# Patient Record
Sex: FEMALE | HISPANIC OR LATINO | URBAN - NONMETROPOLITAN AREA
[De-identification: names, ages, dates, MRNs, and addresses within clinical notes are randomized per-mention and may not be internally consistent; named-entity substitution may affect disease eponyms.]

---

## 2020-08-26 ENCOUNTER — APPOINTMENT (RX ONLY)
Dept: URBAN - NONMETROPOLITAN AREA CLINIC 7 | Facility: CLINIC | Age: 55
Setting detail: DERMATOLOGY
End: 2020-08-26

## 2020-08-26 DIAGNOSIS — D18.0 HEMANGIOMA: ICD-10-CM

## 2020-08-26 DIAGNOSIS — D22 MELANOCYTIC NEVI: ICD-10-CM

## 2020-08-26 DIAGNOSIS — L81.4 OTHER MELANIN HYPERPIGMENTATION: ICD-10-CM

## 2020-08-26 DIAGNOSIS — L82.0 INFLAMED SEBORRHEIC KERATOSIS: ICD-10-CM

## 2020-08-26 DIAGNOSIS — L81.1 CHLOASMA: ICD-10-CM

## 2020-08-26 DIAGNOSIS — L82.1 OTHER SEBORRHEIC KERATOSIS: ICD-10-CM

## 2020-08-26 PROBLEM — D18.01 HEMANGIOMA OF SKIN AND SUBCUTANEOUS TISSUE: Status: ACTIVE | Noted: 2020-08-26

## 2020-08-26 PROBLEM — D22.9 MELANOCYTIC NEVI, UNSPECIFIED: Status: ACTIVE | Noted: 2020-08-26

## 2020-08-26 PROBLEM — D22.5 MELANOCYTIC NEVI OF TRUNK: Status: ACTIVE | Noted: 2020-08-26

## 2020-08-26 PROCEDURE — 17110 DESTRUCTION B9 LES UP TO 14: CPT

## 2020-08-26 PROCEDURE — 99203 OFFICE O/P NEW LOW 30 MIN: CPT | Mod: 25

## 2020-08-26 PROCEDURE — ? COUNSELING

## 2020-08-26 PROCEDURE — ? LIQUID NITROGEN

## 2020-08-26 ASSESSMENT — LOCATION ZONE DERM
LOCATION ZONE: NECK
LOCATION ZONE: FACE
LOCATION ZONE: TRUNK
LOCATION ZONE: SCALP

## 2020-08-26 ASSESSMENT — LOCATION DETAILED DESCRIPTION DERM
LOCATION DETAILED: RIGHT SUPERIOR UPPER BACK
LOCATION DETAILED: RIGHT CLAVICULAR NECK
LOCATION DETAILED: LEFT INFERIOR MEDIAL UPPER BACK
LOCATION DETAILED: EPIGASTRIC SKIN
LOCATION DETAILED: LEFT INFERIOR CENTRAL MALAR CHEEK
LOCATION DETAILED: MIDDLE STERNUM
LOCATION DETAILED: HAIR

## 2020-08-26 ASSESSMENT — LOCATION SIMPLE DESCRIPTION DERM
LOCATION SIMPLE: ABDOMEN
LOCATION SIMPLE: LEFT UPPER BACK
LOCATION SIMPLE: RIGHT UPPER BACK
LOCATION SIMPLE: CHEST
LOCATION SIMPLE: RIGHT ANTERIOR NECK
LOCATION SIMPLE: HAIR
LOCATION SIMPLE: LEFT CHEEK

## 2020-08-26 NOTE — PROCEDURE: LIQUID NITROGEN
Consent: The patient's consent was obtained including but not limited to risks of crusting, scabbing, blistering, scarring, darker or lighter pigmentary change, recurrence, incomplete removal and infection. done verbally.
Add 52 Modifier (Optional): no
Detail Level: Detailed
Post-Care Instructions: I reviewed with the patient in detail post-care instructions. Patient is to wear sunprotection, and avoid picking at any of the treated lesions. Pt may apply Vaseline to crusted or scabbing areas.
Medical Necessity Information: It is in your best interest to select a reason for this procedure from the list below. All of these items fulfill various CMS LCD requirements except the new and changing color options.
Medical Necessity Clause: This procedure was medically necessary because the lesions that were treated were: interfering with lid function

## 2021-06-17 ENCOUNTER — APPOINTMENT (RX ONLY)
Dept: URBAN - NONMETROPOLITAN AREA CLINIC 7 | Facility: CLINIC | Age: 56
Setting detail: DERMATOLOGY
End: 2021-06-17

## 2021-06-17 DIAGNOSIS — L81.1 CHLOASMA: ICD-10-CM | Status: INADEQUATELY CONTROLLED

## 2021-06-17 DIAGNOSIS — L81.4 OTHER MELANIN HYPERPIGMENTATION: ICD-10-CM | Status: STABLE

## 2021-06-17 PROCEDURE — 99214 OFFICE O/P EST MOD 30 MIN: CPT

## 2021-06-17 PROCEDURE — ? PRESCRIPTION

## 2021-06-17 PROCEDURE — ? TREATMENT REGIMEN

## 2021-06-17 PROCEDURE — ? MEDICATION COUNSELING

## 2021-06-17 PROCEDURE — ? COUNSELING

## 2021-06-17 PROCEDURE — ? ADDITIONAL NOTES

## 2021-06-17 RX ORDER — TRETINOIN 0.5 MG/G
PEA LOTION TOPICAL HS
Qty: 1 | Refills: 2 | Status: ERX | COMMUNITY
Start: 2021-06-17

## 2021-06-17 RX ADMIN — TRETINOIN PEA: 0.5 LOTION TOPICAL at 00:00

## 2021-06-17 ASSESSMENT — LOCATION SIMPLE DESCRIPTION DERM
LOCATION SIMPLE: RIGHT FOREARM
LOCATION SIMPLE: RIGHT CHEEK
LOCATION SIMPLE: LEFT CHEEK
LOCATION SIMPLE: LEFT FOREARM

## 2021-06-17 ASSESSMENT — LOCATION DETAILED DESCRIPTION DERM
LOCATION DETAILED: RIGHT INFERIOR CENTRAL MALAR CHEEK
LOCATION DETAILED: RIGHT PROXIMAL DORSAL FOREARM
LOCATION DETAILED: LEFT PROXIMAL DORSAL FOREARM
LOCATION DETAILED: LEFT LATERAL MALAR CHEEK

## 2021-06-17 ASSESSMENT — LOCATION ZONE DERM
LOCATION ZONE: FACE
LOCATION ZONE: ARM

## 2021-06-17 NOTE — PROCEDURE: MEDICATION COUNSELING
Xelisaiz Pregnancy And Lactation Text: This medication is Pregnancy Category D and is not considered safe during pregnancy.  The risk during breast feeding is also uncertain.

## 2021-06-17 NOTE — PROCEDURE: MEDICATION COUNSELING
2018 Doxepin Counseling:  Patient advised that the medication is sedating and not to drive a car after taking this medication. Patient informed of potential adverse effects including but not limited to dry mouth, urinary retention, and blurry vision.  The patient verbalized understanding of the proper use and possible adverse effects of doxepin.  All of the patient's questions and concerns were addressed.

## 2021-06-17 NOTE — PROCEDURE: TREATMENT REGIMEN
Detail Level: Zone
Otc Regimen: Use broad spectrum sunscreen 30 spf or higher and reapply every two hours

## 2021-07-14 ENCOUNTER — APPOINTMENT (RX ONLY)
Dept: URBAN - NONMETROPOLITAN AREA CLINIC 7 | Facility: CLINIC | Age: 56
Setting detail: DERMATOLOGY
End: 2021-07-14

## 2021-07-14 DIAGNOSIS — Z41.9 ENCOUNTER FOR PROCEDURE FOR PURPOSES OTHER THAN REMEDYING HEALTH STATE, UNSPECIFIED: ICD-10-CM

## 2021-07-14 PROCEDURE — ? COSMETIC CONSULTATION: BOTULINUM TOXIN

## 2021-07-14 PROCEDURE — ? ADDITIONAL NOTES

## 2021-07-14 PROCEDURE — ? BOTOX (U OR CC)

## 2021-07-14 ASSESSMENT — LOCATION DETAILED DESCRIPTION DERM
LOCATION DETAILED: LEFT INFERIOR FOREHEAD
LOCATION DETAILED: LEFT MEDIAL FOREHEAD
LOCATION DETAILED: RIGHT SUPERIOR FOREHEAD
LOCATION DETAILED: LEFT LATERAL FOREHEAD
LOCATION DETAILED: RIGHT SUPERIOR MEDIAL FOREHEAD
LOCATION DETAILED: LEFT SUPERIOR FOREHEAD
LOCATION DETAILED: GLABELLA
LOCATION DETAILED: RIGHT MEDIAL EYEBROW
LOCATION DETAILED: RIGHT FOREHEAD
LOCATION DETAILED: RIGHT INFERIOR FOREHEAD

## 2021-07-14 ASSESSMENT — LOCATION SIMPLE DESCRIPTION DERM
LOCATION SIMPLE: RIGHT EYEBROW
LOCATION SIMPLE: GLABELLA
LOCATION SIMPLE: RIGHT FOREHEAD
LOCATION SIMPLE: LEFT FOREHEAD

## 2021-07-14 ASSESSMENT — LOCATION ZONE DERM: LOCATION ZONE: FACE

## 2021-07-14 NOTE — PROCEDURE: COSMETIC CONSULTATION: BOTULINUM TOXIN
Price (Use Numbers Only, No Special Characters Or $): 156 Price (Use Numbers Only, No Special Characters Or $): 929

## 2021-07-14 NOTE — PROCEDURE: BOTOX (U OR CC)
Detail Level: Detailed
Dilution (U/0.1 Cc): 2.5
Measure In Units Or Cc's?: units
Consent: Written consent obtained.\\n\\nRisks include but not limited to lid/brow ptosis, bruising, swelling, diplopia, temporary effect, incomplete chemical denervation. \\n\\nPatient instructed to not lie down for 4 hours and limit physical activity for 24 hours. Patient instructed not to travel by airplane for 48 hours.
Post-Care Instructions: TIPS AND REMINDERS\\n• DO NOT EXERCISE TODAY!\\n• Remain in vertical position for 4-6 hours after injections.\\n• Do not massage or manipulate areas of injections for at least 4 hours.\\n• Use ice packs if swelling occurs at the site of the injections.\\n• Use treated muscles (make facial expressions and contract muscles) for the first 1-2 hours after the injection. This makes the treatment more effective.\\n\\nPOSSIBLE SIDE EFFECTS OF BOTOX INJECTIONS\\n• The following minor, minimal and transient side effects may occur: minor local swelling, bruising, headache, eyelid swelling, mild nausea, and small areas of numbness. These side effects may last up to two weeks.\\n• Mild drooping of the upper eyelid (ptosis) occurs in about 2% of patients. This is asymptomatic. Most patients are not even aware this ptosis has occurred and it usually resolves in 1-2 weeks without any treatment.\\n\\nWHAT TO EXPECT FROM BOTOX INJECTIONS\\n• Cosmetic changes with reduced action of injected muscles usually will not be apparent for 1-3 days. Maximum benefit usually occurs 14 days after injections.\\n• If additional touchups are necessary, you must wait a minimum of 2 weeks between treatments.\\n\\nWHEN TO RETURN TO THE OFFICE\\n• Return to our office in 2-4 weeks for post-treatment evaluation.\\n• We suggest re-treatment in about 3 months to maintain maximum benefits.\\n\\nPLEASE CALL OUR OFFICE IF YOU HAVE ANY QUESTIONS OR CONCERNS! WE ARE JUST A PHONE CALL AWAY
Quantity Per Injection Site (Units): 4
Quantity Per Injection Site (Units): 2

## 2021-10-14 ENCOUNTER — APPOINTMENT (RX ONLY)
Dept: URBAN - NONMETROPOLITAN AREA CLINIC 7 | Facility: CLINIC | Age: 56
Setting detail: DERMATOLOGY
End: 2021-10-14

## 2021-10-14 DIAGNOSIS — Z41.9 ENCOUNTER FOR PROCEDURE FOR PURPOSES OTHER THAN REMEDYING HEALTH STATE, UNSPECIFIED: ICD-10-CM

## 2021-10-14 PROCEDURE — ? BOTOX (U OR CC)

## 2021-10-14 PROCEDURE — ? COSMETIC CONSULTATION: BOTULINUM TOXIN

## 2021-10-14 ASSESSMENT — LOCATION DETAILED DESCRIPTION DERM
LOCATION DETAILED: GLABELLA
LOCATION DETAILED: LEFT INFERIOR FOREHEAD
LOCATION DETAILED: LEFT MEDIAL FOREHEAD
LOCATION DETAILED: RIGHT LATERAL FOREHEAD
LOCATION DETAILED: LEFT INFERIOR TEMPLE
LOCATION DETAILED: RIGHT SUPERIOR LATERAL MALAR CHEEK
LOCATION DETAILED: LEFT LATERAL FOREHEAD
LOCATION DETAILED: RIGHT INFERIOR MEDIAL FOREHEAD
LOCATION DETAILED: LEFT LATERAL EYEBROW

## 2021-10-14 ASSESSMENT — LOCATION SIMPLE DESCRIPTION DERM
LOCATION SIMPLE: GLABELLA
LOCATION SIMPLE: LEFT FOREHEAD
LOCATION SIMPLE: LEFT TEMPLE
LOCATION SIMPLE: LEFT EYEBROW
LOCATION SIMPLE: RIGHT FOREHEAD
LOCATION SIMPLE: RIGHT CHEEK

## 2021-10-14 ASSESSMENT — LOCATION ZONE DERM: LOCATION ZONE: FACE

## 2021-10-14 NOTE — PROCEDURE: COSMETIC CONSULTATION: BOTULINUM TOXIN
Price (Use Numbers Only, No Special Characters Or $): 643 Price (Use Numbers Only, No Special Characters Or $): 291

## 2021-10-14 NOTE — PROCEDURE: BOTOX (U OR CC)
Measure In Units Or Cc's?: units
Consent: Written consent obtained.\\n\\nRisks include but not limited to lid/brow ptosis, bruising, swelling, diplopia, temporary effect, incomplete chemical denervation. \\n\\nPatient instructed to not lie down for 4 hours and limit physical activity for 24 hours. Patient instructed not to travel by airplane for 48 hours.
Post-Care Instructions: TIPS AND REMINDERS\\n• DO NOT EXERCISE TODAY!\\n• Remain in vertical position for 4-6 hours after injections.\\n• Do not massage or manipulate areas of injections for at least 4 hours.\\n• Use ice packs if swelling occurs at the site of the injections.\\n• Use treated muscles (make facial expressions and contract muscles) for the first 1-2 hours after the injection. This makes the treatment more effective.\\n\\nPOSSIBLE SIDE EFFECTS OF BOTOX INJECTIONS\\n• The following minor, minimal and transient side effects may occur: minor local swelling, bruising, headache, eyelid swelling, mild nausea, and small areas of numbness. These side effects may last up to two weeks.\\n• Mild drooping of the upper eyelid (ptosis) occurs in about 2% of patients. This is asymptomatic. Most patients are not even aware this ptosis has occurred and it usually resolves in 1-2 weeks without any treatment.\\n\\nWHAT TO EXPECT FROM BOTOX INJECTIONS\\n• Cosmetic changes with reduced action of injected muscles usually will not be apparent for 1-3 days. Maximum benefit usually occurs 14 days after injections.\\n• If additional touchups are necessary, you must wait a minimum of 2 weeks between treatments.\\n\\nWHEN TO RETURN TO THE OFFICE\\n• Return to our office in 2-4 weeks for post-treatment evaluation.\\n• We suggest re-treatment in about 3 months to maintain maximum benefits.\\n\\nPLEASE CALL OUR OFFICE IF YOU HAVE ANY QUESTIONS OR CONCERNS! WE ARE JUST A PHONE CALL AWAY
Dilution (U/0.1 Cc): 2.5
Detail Level: Detailed
Quantity Per Injection Site (Units): 4

## 2021-10-19 ENCOUNTER — APPOINTMENT (RX ONLY)
Dept: URBAN - NONMETROPOLITAN AREA CLINIC 5 | Facility: CLINIC | Age: 56
Setting detail: DERMATOLOGY
End: 2021-10-19

## 2021-10-19 DIAGNOSIS — L81.1 CHLOASMA: ICD-10-CM | Status: INADEQUATELY CONTROLLED

## 2021-10-19 PROCEDURE — ? COUNSELING

## 2021-10-19 PROCEDURE — ? RECOMMENDATIONS

## 2021-10-19 PROCEDURE — 99213 OFFICE O/P EST LOW 20 MIN: CPT

## 2021-10-19 PROCEDURE — ? MEDICATION COUNSELING

## 2021-10-19 ASSESSMENT — LOCATION SIMPLE DESCRIPTION DERM
LOCATION SIMPLE: RIGHT CHEEK
LOCATION SIMPLE: LEFT CHEEK

## 2021-10-19 ASSESSMENT — LOCATION ZONE DERM: LOCATION ZONE: FACE

## 2021-10-19 ASSESSMENT — LOCATION DETAILED DESCRIPTION DERM
LOCATION DETAILED: LEFT INFERIOR CENTRAL MALAR CHEEK
LOCATION DETAILED: RIGHT INFERIOR CENTRAL MALAR CHEEK

## 2021-10-19 NOTE — PROCEDURE: RECOMMENDATIONS
Render Risk Assessment In Note?: yes
Detail Level: Zone
Recommendation Preamble: Biopsy with oral surgeon

## 2022-02-09 ENCOUNTER — APPOINTMENT (RX ONLY)
Dept: URBAN - NONMETROPOLITAN AREA CLINIC 7 | Facility: CLINIC | Age: 57
Setting detail: DERMATOLOGY
End: 2022-02-09

## 2022-02-09 DIAGNOSIS — Z41.9 ENCOUNTER FOR PROCEDURE FOR PURPOSES OTHER THAN REMEDYING HEALTH STATE, UNSPECIFIED: ICD-10-CM

## 2022-02-09 PROCEDURE — ? BOTOX

## 2022-02-09 ASSESSMENT — LOCATION DETAILED DESCRIPTION DERM
LOCATION DETAILED: NASAL ROOT
LOCATION DETAILED: SUPERIOR MID FOREHEAD
LOCATION DETAILED: LEFT CENTRAL FRONTAL SCALP
LOCATION DETAILED: RIGHT CENTRAL EYEBROW
LOCATION DETAILED: RIGHT MID TEMPLE
LOCATION DETAILED: RIGHT SUPERIOR CENTRAL MALAR CHEEK
LOCATION DETAILED: LEFT INFERIOR TEMPLE
LOCATION DETAILED: RIGHT INFERIOR TEMPLE
LOCATION DETAILED: RIGHT SUPERIOR FOREHEAD
LOCATION DETAILED: RIGHT INFERIOR MEDIAL FOREHEAD
LOCATION DETAILED: LEFT INFERIOR FOREHEAD
LOCATION DETAILED: RIGHT INFERIOR FOREHEAD
LOCATION DETAILED: LEFT SUPERIOR MEDIAL FOREHEAD
LOCATION DETAILED: RIGHT SUPERIOR TEMPLE
LOCATION DETAILED: LEFT MID TEMPLE
LOCATION DETAILED: LEFT SUPERIOR FOREHEAD
LOCATION DETAILED: LEFT SUPERIOR LATERAL MALAR CHEEK
LOCATION DETAILED: LEFT SUPERIOR TEMPLE
LOCATION DETAILED: GLABELLA
LOCATION DETAILED: LEFT INFERIOR MEDIAL FOREHEAD
LOCATION DETAILED: RIGHT LATERAL FOREHEAD
LOCATION DETAILED: GLABELLA
LOCATION DETAILED: RIGHT CENTRAL FRONTAL SCALP

## 2022-02-09 ASSESSMENT — LOCATION SIMPLE DESCRIPTION DERM
LOCATION SIMPLE: RIGHT CHEEK
LOCATION SIMPLE: RIGHT EYEBROW
LOCATION SIMPLE: NOSE
LOCATION SIMPLE: RIGHT TEMPLE
LOCATION SIMPLE: LEFT SCALP
LOCATION SIMPLE: LEFT CHEEK
LOCATION SIMPLE: GLABELLA
LOCATION SIMPLE: GLABELLA
LOCATION SIMPLE: LEFT FOREHEAD
LOCATION SIMPLE: RIGHT SCALP
LOCATION SIMPLE: LEFT TEMPLE
LOCATION SIMPLE: SUPERIOR FOREHEAD
LOCATION SIMPLE: RIGHT TEMPLE
LOCATION SIMPLE: LEFT TEMPLE
LOCATION SIMPLE: RIGHT FOREHEAD

## 2022-02-09 ASSESSMENT — LOCATION ZONE DERM
LOCATION ZONE: FACE
LOCATION ZONE: NOSE
LOCATION ZONE: SCALP
LOCATION ZONE: FACE

## 2022-02-09 NOTE — PROCEDURE: BOTOX
Price (Use Numbers Only, No Special Characters Or $): 557 Price (Use Numbers Only, No Special Characters Or $): 785

## 2022-05-09 ENCOUNTER — APPOINTMENT (RX ONLY)
Dept: URBAN - NONMETROPOLITAN AREA CLINIC 5 | Facility: CLINIC | Age: 57
Setting detail: DERMATOLOGY
End: 2022-05-09

## 2022-05-09 DIAGNOSIS — Z41.9 ENCOUNTER FOR PROCEDURE FOR PURPOSES OTHER THAN REMEDYING HEALTH STATE, UNSPECIFIED: ICD-10-CM

## 2022-05-09 PROCEDURE — ? ADDITIONAL NOTES

## 2022-05-09 PROCEDURE — ? BOTOX

## 2022-05-09 ASSESSMENT — LOCATION SIMPLE DESCRIPTION DERM
LOCATION SIMPLE: GLABELLA
LOCATION SIMPLE: RIGHT FOREHEAD
LOCATION SIMPLE: LEFT EYELID
LOCATION SIMPLE: RIGHT TEMPLE

## 2022-05-09 ASSESSMENT — LOCATION DETAILED DESCRIPTION DERM
LOCATION DETAILED: GLABELLA
LOCATION DETAILED: RIGHT INFERIOR TEMPLE
LOCATION DETAILED: RIGHT INFERIOR MEDIAL FOREHEAD
LOCATION DETAILED: LEFT LATERAL CANTHUS

## 2022-05-09 ASSESSMENT — LOCATION ZONE DERM
LOCATION ZONE: EYELID
LOCATION ZONE: FACE

## 2022-05-09 NOTE — PROCEDURE: BOTOX
Price (Use Numbers Only, No Special Characters Or $): 822 Price (Use Numbers Only, No Special Characters Or $): 046

## 2022-08-25 ENCOUNTER — APPOINTMENT (RX ONLY)
Dept: URBAN - NONMETROPOLITAN AREA CLINIC 5 | Facility: CLINIC | Age: 57
Setting detail: DERMATOLOGY
End: 2022-08-25

## 2022-08-25 ENCOUNTER — RX ONLY (OUTPATIENT)
Age: 57
Setting detail: RX ONLY
End: 2022-08-25

## 2022-08-25 DIAGNOSIS — Z41.9 ENCOUNTER FOR PROCEDURE FOR PURPOSES OTHER THAN REMEDYING HEALTH STATE, UNSPECIFIED: ICD-10-CM

## 2022-08-25 PROCEDURE — ? BOTOX (U OR CC)

## 2022-08-25 PROCEDURE — ? ADDITIONAL NOTES

## 2022-08-25 PROCEDURE — ? COSMETIC CONSULTATION: BOTULINUM TOXIN

## 2022-08-25 RX ORDER — TRETINOIN 0.5 MG/G
LOTION TOPICAL
Qty: 1 | Refills: 0 | Status: ERX

## 2022-08-25 NOTE — PROCEDURE: ADDITIONAL NOTES
Additional Notes: 8 units of Botox was aspirated from Lot: [id number] expiration: 07/2024\\n\\nTopical anesthetic was applied to the ;lateral bilateral canthi, but only for a short period of time.  Patient was provided with a packet of BLT 20%/4%/ 2% lot [id number] exp: 06/30/2023.  She was instructed to open each of the packets, mix thoroughly , and apply the product, in a thin layer, to the area of the face she wished to have anesthetized.  She is to apply the mixture 30 minutes prior to her appointment.  If she applies the product with her hands, then wash hands immediately after.  Patient stated understanding to all. Additional Notes: 8 units of Botox was aspirated from Lot: [id number] expiration: 07/2024\\n\\nTopical anesthetic was applied to the ;lateral bilateral canthi, but only for a short period of time.  Patient was provided with a packet of BLT 20%/4%/ 2% lot [id number] exp: 06/30/2023.  She was instructed to open each of the packets, mix thoroughly , and apply the product, in a thin layer, to the area of the face she wished to have anesthetized.  She is to apply the mixture 30 minutes prior to her appointment.  If she applies the product with her hands, then wash hands immediately after.  Patient stated understanding to all.
